# Patient Record
Sex: FEMALE | URBAN - NONMETROPOLITAN AREA
[De-identification: names, ages, dates, MRNs, and addresses within clinical notes are randomized per-mention and may not be internally consistent; named-entity substitution may affect disease eponyms.]

---

## 2023-10-25 ENCOUNTER — APPOINTMENT (RX ONLY)
Dept: URBAN - NONMETROPOLITAN AREA CLINIC 13 | Facility: CLINIC | Age: 33
Setting detail: DERMATOLOGY
End: 2023-10-25

## 2023-10-25 DIAGNOSIS — Z41.9 ENCOUNTER FOR PROCEDURE FOR PURPOSES OTHER THAN REMEDYING HEALTH STATE, UNSPECIFIED: ICD-10-CM

## 2023-10-25 PROCEDURE — ? JUVEDERM ULTRA XC INJECTION

## 2023-10-25 PROCEDURE — ? COSMETIC CONSULTATION: XEOMIN

## 2023-10-25 PROCEDURE — ? XEOMIN

## 2023-10-25 ASSESSMENT — LOCATION DETAILED DESCRIPTION DERM
LOCATION DETAILED: LEFT SUPERIOR CENTRAL BUCCAL CHEEK
LOCATION DETAILED: RIGHT NASOLABIAL FOLD
LOCATION DETAILED: RIGHT CENTRAL MANDIBULAR CHEEK
LOCATION DETAILED: RIGHT SUPERIOR VERMILION LIP
LOCATION DETAILED: LEFT MEDIAL BUCCAL CHEEK
LOCATION DETAILED: LEFT SUPERIOR VERMILION LIP
LOCATION DETAILED: RIGHT LATERAL BUCCAL CHEEK
LOCATION DETAILED: LEFT LATERAL BUCCAL CHEEK
LOCATION DETAILED: RIGHT SUPERIOR LATERAL BUCCAL CHEEK
LOCATION DETAILED: LEFT UPPER CUTANEOUS LIP
LOCATION DETAILED: RIGHT MEDIAL BUCCAL CHEEK
LOCATION DETAILED: LEFT INFERIOR VERMILION LIP
LOCATION DETAILED: RIGHT INFERIOR VERMILION LIP

## 2023-10-25 ASSESSMENT — LOCATION SIMPLE DESCRIPTION DERM
LOCATION SIMPLE: LEFT CHEEK
LOCATION SIMPLE: LEFT LIP
LOCATION SIMPLE: RIGHT LIP
LOCATION SIMPLE: RIGHT CHEEK

## 2023-10-25 ASSESSMENT — LOCATION ZONE DERM
LOCATION ZONE: FACE
LOCATION ZONE: LIP

## 2023-10-25 NOTE — PROCEDURE: JUVEDERM ULTRA XC INJECTION
Filler: Juvederm Ultra XC
Lot #: [id number]
Jawline Filler Volume In Cc: 0
Nasolabial Folds Filler Volume In Cc: 0.3
Additional Anesthesia Volume In Cc: 6
Procedural Text: The filler was administered to the treatment areas noted above.\\n\\nPhotos taken prior to injections and post injections.\\n\\nFiller administered per written protocol per Vandana Alberts MD.
Price (Use Numbers Only, No Special Characters Or $): 736
Vermilion Lips Filler Volume In Cc: 0.5
Map Statment: See Attach Map for Complete Details
Additional Area 1 Location: Lower vermillion lip
Number Of Syringes (Required For Inventory): 1
Expiration Date (Month Year): 11-29-23
Consent: Electronic consent obtained through [employer]. Risks include but not limited to bruising, beading, irregular texture, ulceration, infection, allergic reaction, scar formation, incomplete augmentation, temporary nature, procedural pain.
Topical Anesthesia?: 23% lidocaine, 7% tetracaine
Include Cannula Information In Note?: No
Marionette Lines Filler Volume In Cc: 0.2
Detail Level: Detailed
Post-Care Instructions: Patient instructed to apply ice to reduce swelling and arnica gel to minimize bruising.\\n\\nPatient advised to come back in 2 weeks if any concerns develop.

## 2023-10-25 NOTE — PROCEDURE: XEOMIN
Show Additional Area 3: Yes
Lcl Root Units: 0
Post-Care Instructions: Patient instructed to not lie down for 4 hours, do not massage injected areas, no headbands or hats if forehead was treated, and limit physical activity/extreme heat for 24 hours.
Dilution (U/0.1 Cc): 4
Additional Area 1 Location: Lateral brow
Show Lcl Units: No
Detail Level: Detailed
Expiration Date (Month Year): 04/2025
Additional Area 2 Location: Brow lift 
Additional Area 3 Location: Lip Flip
Masseter Units: 40
Consent: Electronic consent obtained through [employer]. Risks include but not limited to lid/brow ptosis, bruising, swelling, diplopia, temporary effect, incomplete chemical denervation. \\n\\nXeomin administered per written protocol per Vandana Alberts MD.
Lot #: [id number]